# Patient Record
Sex: FEMALE | Race: WHITE | NOT HISPANIC OR LATINO | URBAN - METROPOLITAN AREA
[De-identification: names, ages, dates, MRNs, and addresses within clinical notes are randomized per-mention and may not be internally consistent; named-entity substitution may affect disease eponyms.]

---
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
## 2025-01-24 ENCOUNTER — OFFICE VISIT (OUTPATIENT)
Dept: FAMILY MEDICINE CLINIC | Facility: CLINIC | Age: 20
End: 2025-01-24
Payer: COMMERCIAL

## 2025-01-24 VITALS
SYSTOLIC BLOOD PRESSURE: 102 MMHG | DIASTOLIC BLOOD PRESSURE: 60 MMHG | OXYGEN SATURATION: 99 % | TEMPERATURE: 97.8 F | BODY MASS INDEX: 26.68 KG/M2 | HEIGHT: 62 IN | WEIGHT: 145 LBS | HEART RATE: 84 BPM

## 2025-01-24 DIAGNOSIS — Z32.00 POSSIBLE PREGNANCY: ICD-10-CM

## 2025-01-24 DIAGNOSIS — N91.2 AMENORRHEA: Primary | ICD-10-CM

## 2025-01-24 PROBLEM — F41.9 ANXIETY: Status: ACTIVE | Noted: 2019-02-11

## 2025-01-24 LAB
B-HCG UR QL: POSITIVE
EXPIRATION DATE: ABNORMAL
HCG INTACT+B SERPL-ACNC: 6.08 MIU/ML
INTERNAL NEGATIVE CONTROL: ABNORMAL
INTERNAL POSITIVE CONTROL: ABNORMAL
Lab: ABNORMAL

## 2025-01-24 PROCEDURE — 99213 OFFICE O/P EST LOW 20 MIN: CPT | Performed by: FAMILY MEDICINE

## 2025-01-24 PROCEDURE — 84702 CHORIONIC GONADOTROPIN TEST: CPT | Performed by: FAMILY MEDICINE

## 2025-01-24 PROCEDURE — 81025 URINE PREGNANCY TEST: CPT | Performed by: FAMILY MEDICINE

## 2025-01-24 RX ORDER — BUSPIRONE HYDROCHLORIDE 10 MG/1
10 TABLET ORAL 3 TIMES DAILY
COMMUNITY

## 2025-01-24 NOTE — PROGRESS NOTES
"Chief Complaint  Contraception (Pt here for birth control.  Pt took an  pill early December, unsure of date, but would like to do a pregnancy test due to not having a period since . )    Subjective      Pablo Alva is a 19 y.o. female who presents to [facility]     Patient here to establish care.    PMH: none  SH: vapes (recently switched to a 0 nicotine vape), no alcohol use, uses marijuana.    Here to follow-up recent pregnancy/.  She took an  pill in a clinic in early December, not sure of exact date. She has not had a menstrual period since the  - following taking those pills, she had about a week of bleeding and passing of tissue like a period. LMP was towards the end of October, not sure of exact date. She feels like she is eating a lot and gaining weight. She is usually having regular monthly periods. No discharge or bleeding since early December. She has not had any additional nausea (has some at baseline) but no vomiting. No fever or chills. She also reports some abdominal pain at baseline, maybe some new pain down by the bladder. That pregnancy was her first pregnancy and not a planned pregnancy.    Objective   Vital Signs:   Vitals:    25 1546   BP: 102/60   Pulse: 84   Temp: 97.8 °F (36.6 °C)   SpO2: 99%   Weight: 65.8 kg (145 lb)   Height: 158.1 cm (62.25\")     Body mass index is 26.31 kg/m².    Wt Readings from Last 3 Encounters:   25 65.8 kg (145 lb) (75%, Z= 0.67)*   24 64 kg (141 lb 1.6 oz) (71%, Z= 0.54)*     * Growth percentiles are based on CDC (Girls, 2-20 Years) data.     BP Readings from Last 3 Encounters:   25 102/60   24 119/66       Health Maintenance   Topic Date Due    BMI FOLLOWUP  Never done    Pneumococcal Vaccine 0-64 (1 of 1 - PPSV23 or PCV20) 2011    INFLUENZA VACCINE  2024    COVID-19 Vaccine ( - - season) Never done    HEPATITIS C SCREENING  Never done    " ANNUAL PHYSICAL  Never done    TDAP/TD VACCINES (2 - Td or Tdap) 2026    RSV Vaccine - Adults (1 - 1-dose 75+ series) 2080    MENINGOCOCCAL VACCINE  Completed    HPV VACCINES  Completed       Physical Exam  Vitals and nursing note reviewed.   Constitutional:       General: She is not in acute distress.     Appearance: Normal appearance.   Cardiovascular:      Rate and Rhythm: Normal rate and regular rhythm.   Pulmonary:      Effort: Pulmonary effort is normal. No respiratory distress.      Breath sounds: Normal breath sounds. No wheezing.   Abdominal:      General: There is no distension.      Palpations: Abdomen is soft. There is no mass.      Comments: Very mild suprapubic tenderness   Skin:     General: Skin is warm and dry.   Neurological:      Mental Status: She is alert.   Psychiatric:         Mood and Affect: Mood normal.         Behavior: Behavior normal.          Result Review :  The following data was reviewed by: Bill Ayoub MD on 2025:                                     Lab Results   Lab 25  1550   HCG URINE Positive*                               Procedures          Assessment & Plan  Amenorrhea  She had a faintly positive urine hCG test and has not had a period since the induced menstrual period from medication .  Urine hCG test can still be a false positive for several weeks following an , however.  We are getting a serum hCG test for confirmation.  If serum hCG results are positive for pregnancy, will suggest/offer referral to OB/GYN.    Orders:    POCT pregnancy, urine    hCG, Quantitative, Pregnancy; Future    hCG, Quantitative, Pregnancy    Possible pregnancy    Orders:    hCG, Quantitative, Pregnancy; Future    hCG, Quantitative, Pregnancy         Pediatric BMI = 84 %ile (Z= 1.01) based on CDC (Girls, 2-20 Years) BMI-for-age based on BMI available on 2025.. BMI is within normal parameters. No other follow-up for BMI required.         FOLLOW  UP  Return if symptoms worsen or fail to improve.  Patient was given instructions and counseling regarding her condition or for health maintenance advice. Please see specific information pulled into the AVS if appropriate.       Bill Ayoub MD  01/24/25  16:12 EST    CURRENT & DISCONTINUED MEDICATIONS  Current Outpatient Medications   Medication Instructions    busPIRone (BUSPAR) 10 mg, 3 Times Daily         Medications Discontinued During This Encounter   Medication Reason    doxylamine (UNISOM) 25 MG tablet *Therapy completed    vitamin B-6 (PYRIDOXINE) 25 MG tablet *Therapy completed

## 2025-01-24 NOTE — PROGRESS NOTES
Venipuncture Blood Specimen Collection  Venipuncture performed in left arm by Marisabel Gibbs with good hemostasis. Patient tolerated the procedure well without complications.   01/24/25   Marisabel Gibbs

## 2025-01-27 ENCOUNTER — TELEPHONE (OUTPATIENT)
Dept: FAMILY MEDICINE CLINIC | Facility: CLINIC | Age: 20
End: 2025-01-27

## 2025-01-27 NOTE — TELEPHONE ENCOUNTER
HUB TO RELAY: Provider has not seen result yet. Will contact patient tomorrow once PCP has reviewed result.

## 2025-01-27 NOTE — TELEPHONE ENCOUNTER
Caller: Pablo Alva    Relationship: Self    Best call back number: [phone]     Caller requesting test results: SELF    What test was performed: PREGNANCY TEST    When was the test performed: 1/24/25    Where was the test performed: [facility]

## 2025-02-04 ENCOUNTER — OFFICE VISIT (OUTPATIENT)
Dept: FAMILY MEDICINE CLINIC | Facility: CLINIC | Age: 20
End: 2025-02-04
Payer: COMMERCIAL

## 2025-02-04 VITALS
HEART RATE: 102 BPM | HEIGHT: 62 IN | WEIGHT: 145 LBS | TEMPERATURE: 98.2 F | BODY MASS INDEX: 26.68 KG/M2 | OXYGEN SATURATION: 100 %

## 2025-02-04 DIAGNOSIS — Z32.00 POSSIBLE PREGNANCY: Primary | ICD-10-CM

## 2025-02-04 DIAGNOSIS — Z30.09 ENCOUNTER FOR COUNSELING REGARDING CONTRACEPTION: ICD-10-CM

## 2025-02-04 LAB — HCG INTACT+B SERPL-ACNC: 4.53 MIU/ML

## 2025-02-04 PROCEDURE — 84702 CHORIONIC GONADOTROPIN TEST: CPT | Performed by: FAMILY MEDICINE

## 2025-02-04 PROCEDURE — 1160F RVW MEDS BY RX/DR IN RCRD: CPT | Performed by: FAMILY MEDICINE

## 2025-02-04 PROCEDURE — 99213 OFFICE O/P EST LOW 20 MIN: CPT | Performed by: FAMILY MEDICINE

## 2025-02-04 PROCEDURE — 1159F MED LIST DOCD IN RCRD: CPT | Performed by: FAMILY MEDICINE

## 2025-02-04 NOTE — PROGRESS NOTES
..  Venipuncture Blood Specimen Collection  Venipuncture performed in LT arm by Monica Israel with good hemostasis. Patient tolerated the procedure well without complications.   02/04/25   Sara Nuñez MA

## 2025-02-04 NOTE — PROGRESS NOTES
"Chief Complaint  Possible Pregnancy (Repeat HCG lab from last week)    Subjective      Pablo Alva is a 20 y.o. female who presents to [facility]     Follow-up for possible pregnancy. She took an  pill in a clinic in early December, not sure of exact date. She has not had a menstrual period since the  - following taking those pills, she had about a week of bleeding and passing of tissue like a period. LMP was towards the end of October, not sure of exact date.  She had a quantitative hCG of 6.08 on 2025 which would be consistent with roughly 3-4 weeks pregnant but it may also still be elevated following the recent .  I recommended that she return to get a repeat quantitative hCG test and so that is why she is here today.    She reports she had a menstrual period since she was last here. It lasted for about 3-4 days. No excess bleeding or pain, it felt like a pretty normal period for her.     She is interested in being on birth control. She was on an OCP before that made her feel \"tano crazy\". She wanted to discuss the progestin-only pills.    Objective   Vital Signs:   Vitals:    25 1315   Pulse: 102   Temp: 98.2 °F (36.8 °C)   TempSrc: Temporal   SpO2: 100%   Weight: 65.8 kg (145 lb)   Height: 158.1 cm (62.25\")     Body mass index is 26.31 kg/m².    Wt Readings from Last 3 Encounters:   25 65.8 kg (145 lb)   25 65.8 kg (145 lb) (75%, Z= 0.67)*   24 64 kg (141 lb 1.6 oz) (71%, Z= 0.54)*     * Growth percentiles are based on CDC (Girls, 2-20 Years) data.     BP Readings from Last 3 Encounters:   25 102/60   24 119/66       Health Maintenance   Topic Date Due    BMI FOLLOWUP  Never done    Pneumococcal Vaccine 0-64 (1  - PPSV23) 2011    INFLUENZA VACCINE  2024    HEPATITIS C SCREENING  Never done    ANNUAL PHYSICAL  Never done    COVID-19 Vaccine (2024- season) 2025 (Originally 2024)    " TDAP/TD VACCINES (2 - Td or Tdap) 08/11/2026    RSV Vaccine - Adults (1 - 1-dose 75+ series) 02/03/2080    MENINGOCOCCAL VACCINE  Completed    HPV VACCINES  Completed       Physical Exam  Vitals and nursing note reviewed.   Constitutional:       General: She is not in acute distress.     Appearance: Normal appearance.   Cardiovascular:      Rate and Rhythm: Normal rate and regular rhythm.   Pulmonary:      Effort: Pulmonary effort is normal. No respiratory distress.      Breath sounds: Normal breath sounds. No wheezing.   Skin:     General: Skin is warm and dry.   Neurological:      Mental Status: She is alert.   Psychiatric:         Mood and Affect: Mood normal.         Behavior: Behavior normal.          Result Review :  The following data was reviewed by: Bill Ayoub MD on 02/04/2025:       Office Visit on 01/24/2025   Component Date Value    HCG, Urine, QL 01/24/2025 Positive (A)     Lot Number 01/24/2025 [id number]     Internal Positive Control 01/24/2025 Passed     Internal Negative Control 01/24/2025 Passed     Expiration Date 01/24/[phone]     HCG Quantitative 01/24/2025 6.08      Procedures          Assessment & Plan  Possible pregnancy  We will repeat the hCG to ensure that it is going down.  She had a menstrual period within the past week so I explained that alone essentially confirms that she is no longer pregnant, but given the recent lab work I thought it was reasonable to double check with the quantitative hCG.  Orders:    hCG, Quantitative, Pregnancy    Encounter for counseling regarding contraception  Assuming that the hCG was negative, she is interested in starting contraception.  She has previously been on OCPs and had a bad reaction to those, they did not make her feel right.  A friend had mentioned the progestin contraceptive pills so she was interested in that.  I discussed the various options of contraceptives available to her at this appointment including OCPs, progestin only  pills, IUD, Depo shot, Nexplanon.  She was interested in talking with gynecology and I placed a referral to them for their assistance.    When results come back, assuming negative hCG, will offer to send in the progestin only pills if she is interested in that.  Orders:    Ambulatory Referral to Gynecology             FOLLOW UP  Return if symptoms worsen or fail to improve.  Patient was given instructions and counseling regarding her condition or for health maintenance advice. Please see specific information pulled into the AVS if appropriate.       Bill Ayoub MD  02/04/25  13:33 EST    CURRENT & DISCONTINUED MEDICATIONS  Current Outpatient Medications   Medication Instructions    busPIRone (BUSPAR) 10 mg, 3 Times Daily       There are no discontinued medications.

## 2025-02-05 DIAGNOSIS — Z30.09 ENCOUNTER FOR COUNSELING REGARDING CONTRACEPTION: Primary | ICD-10-CM

## 2025-02-05 RX ORDER — ACETAMINOPHEN AND CODEINE PHOSPHATE 120; 12 MG/5ML; MG/5ML
1 SOLUTION ORAL DAILY
Qty: 28 TABLET | Refills: 12 | Status: SHIPPED | OUTPATIENT
Start: 2025-02-05 | End: 2026-02-05

## 2025-02-25 ENCOUNTER — OFFICE VISIT (OUTPATIENT)
Dept: FAMILY MEDICINE CLINIC | Facility: CLINIC | Age: 20
End: 2025-02-25
Payer: COMMERCIAL

## 2025-02-25 VITALS
OXYGEN SATURATION: 98 % | SYSTOLIC BLOOD PRESSURE: 114 MMHG | HEART RATE: 89 BPM | BODY MASS INDEX: 26.51 KG/M2 | DIASTOLIC BLOOD PRESSURE: 74 MMHG | TEMPERATURE: 97.8 F | WEIGHT: 146.1 LBS

## 2025-02-25 DIAGNOSIS — N89.8 VAGINAL DISCHARGE: ICD-10-CM

## 2025-02-25 DIAGNOSIS — R35.0 FREQUENT URINATION: ICD-10-CM

## 2025-02-25 DIAGNOSIS — Z23 IMMUNIZATION DUE: ICD-10-CM

## 2025-02-25 DIAGNOSIS — R10.9 ABDOMINAL PAIN, UNSPECIFIED ABDOMINAL LOCATION: Primary | ICD-10-CM

## 2025-02-25 LAB
BILIRUB BLD-MCNC: NEGATIVE MG/DL
C TRACH RRNA CVX QL NAA+PROBE: NOT DETECTED
CANDIDA SPECIES: NEGATIVE
CLARITY, POC: CLEAR
COLOR UR: YELLOW
EXPIRATION DATE: NORMAL
GARDNERELLA VAGINALIS: NEGATIVE
GLUCOSE UR STRIP-MCNC: NEGATIVE MG/DL
KETONES UR QL: NEGATIVE
LEUKOCYTE EST, POC: NEGATIVE
Lab: NORMAL
N GONORRHOEA RRNA SPEC QL NAA+PROBE: NOT DETECTED
NITRITE UR-MCNC: NEGATIVE MG/ML
PH UR: 7 [PH] (ref 5–8)
PROT UR STRIP-MCNC: NEGATIVE MG/DL
RBC # UR STRIP: NEGATIVE /UL
SP GR UR: 1 (ref 1–1.03)
T VAGINALIS DNA VAG QL PROBE+SIG AMP: NEGATIVE
UROBILINOGEN UR QL: NORMAL

## 2025-02-25 PROCEDURE — 87510 GARDNER VAG DNA DIR PROBE: CPT | Performed by: FAMILY MEDICINE

## 2025-02-25 PROCEDURE — 87491 CHLMYD TRACH DNA AMP PROBE: CPT | Performed by: FAMILY MEDICINE

## 2025-02-25 PROCEDURE — 87591 N.GONORRHOEAE DNA AMP PROB: CPT | Performed by: FAMILY MEDICINE

## 2025-02-25 PROCEDURE — 87086 URINE CULTURE/COLONY COUNT: CPT | Performed by: FAMILY MEDICINE

## 2025-02-25 PROCEDURE — 87480 CANDIDA DNA DIR PROBE: CPT | Performed by: FAMILY MEDICINE

## 2025-02-25 PROCEDURE — 87660 TRICHOMONAS VAGIN DIR PROBE: CPT | Performed by: FAMILY MEDICINE

## 2025-02-25 NOTE — PROGRESS NOTES
..  Venipuncture Blood Specimen Collection  Venipuncture performed in left arm by Monica Israel with good hemostasis. Patient tolerated the procedure well without complications.   02/25/25   Monica Israel

## 2025-02-25 NOTE — PROGRESS NOTES
Chief Complaint  Abdominal Pain    Subjective      Pablo Alva is a 20 y.o. female who presents to [facility]     Abdominal pain. Pain all over her abdomen. Thinks she might have pain from a yeast infection - her bladder was hurting and she had some bloating initially. The pain started a week or two ago but then got worse today. No fevers or chills. No pain when urinating it just feels like she often has to go. Says she never has normal bowel movements. A little diarrhea currently. No blood in the stool or urine. Some vaginal discharge that is thicker. No extra pain or itching in her vaginal area. No relation to menstrual cycle - LMP was February. Abdominal pain is more of a cramp. Normal pain is fairly constant but it got really bad this morning.     Objective   Vital Signs:   Vitals:    02/25/25 1544   BP: 114/74   BP Location: Right arm   Patient Position: Sitting   Pulse: 89   Temp: 97.8 °F (36.6 °C)   SpO2: 98%   Weight: 66.3 kg (146 lb 1.6 oz)     Body mass index is 26.51 kg/m².    Wt Readings from Last 3 Encounters:   02/25/25 66.3 kg (146 lb 1.6 oz)   02/04/25 65.8 kg (145 lb)   01/24/25 65.8 kg (145 lb) (75%, Z= 0.67)*     * Growth percentiles are based on CDC (Girls, 2-20 Years) data.     BP Readings from Last 3 Encounters:   02/25/25 114/74   01/24/25 102/60   12/01/24 119/66       Health Maintenance   Topic Date Due    COVID-19 Vaccine (1 - 2024-25 season) Never done    HEPATITIS C SCREENING  Never done    ANNUAL PHYSICAL  Never done    BMI FOLLOWUP  02/25/2026    TDAP/TD VACCINES (2 - Td or Tdap) 08/11/2026    RSV Vaccine - Adults (1 - 1-dose 75+ series) 02/03/2080    Pneumococcal Vaccine 0-49  Completed    INFLUENZA VACCINE  Completed    MENINGOCOCCAL VACCINE  Completed    HPV VACCINES  Completed       Physical Exam  Vitals and nursing note reviewed.   Constitutional:       General: She is not in acute distress.     Appearance: Normal appearance.   Cardiovascular:       Rate and Rhythm: Normal rate and regular rhythm.   Pulmonary:      Effort: Pulmonary effort is normal. No respiratory distress.      Breath sounds: Normal breath sounds. No wheezing.   Abdominal:      General: There is no distension.      Palpations: Abdomen is soft. There is no mass.      Tenderness: There is no abdominal tenderness. There is no right CVA tenderness or left CVA tenderness.   Skin:     General: Skin is warm and dry.   Neurological:      Mental Status: She is alert.   Psychiatric:         Mood and Affect: Mood normal.         Behavior: Behavior normal.          Result Review :  The following data was reviewed by: Bill Ayoub MD on 02/25/2025:       Office Visit on 02/25/2025   Component Date Value    Color 02/25/2025 Yellow     Clarity, UA 02/25/2025 Clear     Specific Gravity  02/25/2025 1.005     pH, Urine 02/25/2025 7.0     Leukocytes 02/25/2025 Negative     Nitrite, UA 02/25/2025 Negative     Protein, POC 02/25/2025 Negative     Glucose, UA 02/25/2025 Negative     Ketones, UA 02/25/2025 Negative     Urobilinogen, UA 02/25/2025 0.2 E.U./dL     Bilirubin 02/25/2025 Negative     Blood, UA 02/25/2025 Negative     Lot Number 02/25/2025 [id number]     Expiration Date 02/25/2025 9-19-26    Office Visit on 02/04/2025   Component Date Value    HCG Quantitative 02/04/2025 4.53      Procedures          Assessment & Plan  Immunization due    Orders:    Fluzone >6mos (3248-1709)    Abdominal pain, unspecified abdominal location  Uncertain cause of abdominal pain.  Urinalysis shows no abnormalities in clinic today.  LMP was within the last few weeks so pregnancy is very unlikely.  She is having thick vaginal discharge so I ordered a gonorrhea and chlamydia test as well as swab for BV, trichomonas, and yeast infection.  Also sending a urine culture for confirmation.  Discussed if symptoms worsen to go to the ER.  Otherwise we will wait on results.  Orders:    POCT urinalysis dipstick,  automated    Vaginal discharge    Orders:    Chlamydia trachomatis, Neisseria gonorrhoeae, PCR - , Urine, Clean Catch    Gardnerella vaginalis, Trichomonas vaginalis, Candida albicans, DNA - Swab, Vagina    Frequent urination    Orders:    Urine Culture - Urine, Urine, Clean Catch                FOLLOW UP  Return if symptoms worsen or fail to improve.  Patient was given instructions and counseling regarding her condition or for health maintenance advice. Please see specific information pulled into the AVS if appropriate.       Bill Ayoub MD  02/25/25  16:11 EST    CURRENT & DISCONTINUED MEDICATIONS  Current Outpatient Medications   Medication Instructions    busPIRone (BUSPAR) 10 mg, 3 Times Daily    norethindrone (ORTHO MICRONOR) 0.35 mg, Oral, Daily       There are no discontinued medications.

## 2025-02-27 LAB — BACTERIA SPEC AEROBE CULT: NO GROWTH

## 2025-03-18 ENCOUNTER — TELEPHONE (OUTPATIENT)
Dept: OBSTETRICS AND GYNECOLOGY | Facility: CLINIC | Age: 20
End: 2025-03-18
Payer: COMMERCIAL

## 2025-03-18 NOTE — TELEPHONE ENCOUNTER
Spoke to patient & advised the provider would be out of office and the apptmt was cancelled due to this.  Asked the patient if would like to be reschedule at this time or would need me to call back.  Patient advised she would call back to reschedule, herself.  Sent letter to patient to serv as a reminder to call back to reschedule.

## 2025-05-15 ENCOUNTER — OFFICE VISIT (OUTPATIENT)
Dept: FAMILY MEDICINE CLINIC | Facility: CLINIC | Age: 20
End: 2025-05-15
Payer: COMMERCIAL

## 2025-05-15 VITALS
WEIGHT: 145 LBS | DIASTOLIC BLOOD PRESSURE: 70 MMHG | TEMPERATURE: 98.1 F | BODY MASS INDEX: 26.31 KG/M2 | SYSTOLIC BLOOD PRESSURE: 102 MMHG | OXYGEN SATURATION: 100 % | HEART RATE: 80 BPM
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
## 2025-05-15 DIAGNOSIS — L70.9 ACNE, UNSPECIFIED ACNE TYPE: Primary | ICD-10-CM

## 2025-05-15 RX ORDER — BENZOYL PEROXIDE 5 G/100G
1 GEL TOPICAL DAILY
Qty: 42.5 G | Refills: 0 | Status: SHIPPED | OUTPATIENT
Start: 2025-05-15

## 2025-05-15 NOTE — PROGRESS NOTES
Chief Complaint  Acne (Referral to dermatology ) and Toe Pain (Middle toe on left foot sore x five days )    Subjective      Pablo Alva is a 20 y.o. female who presents to [facility]     Acne.  She wants a referral to dermatology. She has tried [facility] (a prescription) and then some OTC stuff with no improvement.  She tried a few over-the-counter things that were not affective so she was hesitant to try anything else until talking to a provider.    Left middle toe pain.  Has been going on for about 5 days. Thinks she got a bug bite there and it has been getting more itchy and then turned into a pimple, she popped the pimple, and it has gotten red, itchy, and painful.    Objective   Vital Signs:   Vitals:    05/15/25 1326   BP: 102/70   Pulse: 80   Temp: 98.1 °F (36.7 °C)   SpO2: 100%   Weight: 65.8 kg (145 lb)     Body mass index is 26.31 kg/m².    Wt Readings from Last 3 Encounters:   05/15/25 65.8 kg (145 lb)   02/25/25 66.3 kg (146 lb 1.6 oz)   02/04/25 65.8 kg (145 lb)     BP Readings from Last 3 Encounters:   05/15/25 102/70   02/25/25 114/74   01/24/25 102/60       Health Maintenance   Topic Date Due    HEPATITIS C SCREENING  Never done    ANNUAL PHYSICAL  Never done    MENINGOCOCCAL B VACCINE (1 of 2 - Standard) 11/11/2025 (Originally 2/3/2021)    COVID-19 Vaccine (1 - 2024-25 season) 11/15/2025 (Originally 9/1/2024)    INFLUENZA VACCINE  07/01/2025    TDAP/TD VACCINES (2 - Td or Tdap) 08/11/2026    RSV Vaccine - Adults (1 - 1-dose 75+ series) 02/03/2080    Pneumococcal Vaccine 0-49  Completed    MENINGOCOCCAL VACCINE  Completed    HPV VACCINES  Completed    CHLAMYDIA SCREENING  Discontinued       Physical Exam  Vitals and nursing note reviewed.   Constitutional:       General: She is not in acute distress.     Appearance: Normal appearance.   HENT:      Head:      Comments: Acne present on face  Cardiovascular:      Rate and Rhythm: Normal rate and regular rhythm.    Pulmonary:      Effort: Pulmonary effort is normal. No respiratory distress.      Breath sounds: Normal breath sounds. No wheezing.   Skin:     General: Skin is warm and dry.      Comments: Left middle toe on the lateral side has a small raised bump and burst blister. Nontender and no pus or oozing. Not really any redness but a little swelling around that.   Neurological:      Mental Status: She is alert.   Psychiatric:         Mood and Affect: Mood normal.         Behavior: Behavior normal.          Result Review :  The following data was reviewed by: Bill Ayoub MD on 05/15/2025:         Procedures          Assessment & Plan  Acne, unspecified acne type  Refer to dermatology as requested, but upon talking with our referrals team, there is no dermatologist who is taking patient's insurance in the nearby area.  The closest one is [place].  She declined traveling that far.      Also starting her on benzyl peroxide gel to help with the acne.  If no improvement with the benzyl peroxide, we could always try switching to or adding on topical clindamycin.  Orders:    benzoyl peroxide 5 % gel; Apply 1 Application topically to the appropriate area as directed Daily.    Bug bite, initial encounter  Looks to be a bug bite on the toe.  Recommended Benadryl for any itching and swelling.  I advised that it does not look infected at this time so I would not recommend any p.o. antibiotics, but using over-the-counter topical antibiotics such as triple antibiotic cream or bacitracin cream is recommended to help prevent it from getting infected.  Follow-up or let us know if anything worsens or changes.    In the future I do not recommend popping any blisters like that as that does increase the risk of infection.                      FOLLOW UP  Return if symptoms worsen or fail to improve.  Patient was given instructions and counseling regarding her condition or for health maintenance advice. Please see specific  information pulled into the AVS if appropriate.       Bill Ayoub MD  05/15/25  13:43 EDT    CURRENT & DISCONTINUED MEDICATIONS  Current Outpatient Medications   Medication Instructions    benzoyl peroxide 5 % gel 1 Application, Topical, Daily    busPIRone (BUSPAR) 10 mg, 3 Times Daily       Medications Discontinued During This Encounter   Medication Reason    norethindrone (Ortho Micronor) 0.35 MG tablet *Therapy completed